# Patient Record
Sex: FEMALE | Race: WHITE
[De-identification: names, ages, dates, MRNs, and addresses within clinical notes are randomized per-mention and may not be internally consistent; named-entity substitution may affect disease eponyms.]

---

## 2018-05-07 ENCOUNTER — HOSPITAL ENCOUNTER (OUTPATIENT)
Dept: HOSPITAL 88 - MRI | Age: 53
End: 2018-05-07
Attending: INTERNAL MEDICINE
Payer: MEDICARE

## 2018-05-07 DIAGNOSIS — R74.8: Primary | ICD-10-CM

## 2018-05-07 PROCEDURE — 74183 MRI ABD W/O CNTR FLWD CNTR: CPT

## 2018-05-07 NOTE — DIAGNOSTIC IMAGING REPORT
EXAM: MRI of the abdomen with and without contrast.



INDICATION: Abdominal pain. Concern for diverticulitis.



COMPARISON:   None.



TECHNIQUE: Multiplanar and multisequence imaging was performed of the abdomen.

T1 and T2-weighted images were obtained with and without contrast. T1-weighted

in and out-of-phase , Dynamic, post gadolinium T1-weighted spoiled gradient

echo scans. 



IV Contrast: 10 cc of MultiHance

Oral Contrast: None.

Medications: None



DISCUSSION:



LOWER THORAX: Unremarkable.  



HEPATOBILIARY:      No focal hepatic lesions. 0.9 cm right hepatic lobe cyst.

No biliary ductal dilation. 



GALLBLADDER: Not visualized.



SPLEEN: No splenomegaly. 



PANCREAS: No focal masses or ductal dilatation.  



ADRENALS: No adrenal nodules    



KIDNEYS/URETERS: Kidneys enhance symmetrically.  No hydronephrosis. No renal

mass. Bilateral renal cysts, some hemorrhagic, measuring up to 1.1 cm.     



GI TRACT: No abnormal distention or evidence of bowel obstruction.  Colonic

diverticulosis with questionable mild focal perisigmoid colon fat stranding,

only imaged during coronal acquisition (series 3, image 16).    



LYMPH NODES: No lymphadenopathy.



VESSELS: Unremarkable.



BONES: Unremarkable.



SOFT TISSUES: Unremarkable.           



IMPRESSION:

Colonic diverticulosis with questionable mild diverticulitis of the sigmoid

colon, seen/imaged only on coronal series. This can be confirmed with

abdominal/pelvic CT with contrast.





Signed by: Dr. Amadeo Rodgers MD on 5/7/2018 6:05 PM